# Patient Record
Sex: MALE | Race: WHITE | Employment: UNEMPLOYED | URBAN - METROPOLITAN AREA
[De-identification: names, ages, dates, MRNs, and addresses within clinical notes are randomized per-mention and may not be internally consistent; named-entity substitution may affect disease eponyms.]

---

## 2018-01-19 ENCOUNTER — OFFICE VISIT CONVERTED (OUTPATIENT)
Dept: SURGERY | Facility: CLINIC | Age: 58
End: 2018-01-19
Attending: UROLOGY

## 2018-04-16 ENCOUNTER — OFFICE VISIT CONVERTED (OUTPATIENT)
Dept: SURGERY | Facility: CLINIC | Age: 58
End: 2018-04-16
Attending: SURGERY

## 2020-04-28 ENCOUNTER — TELEMEDICINE CONVERTED (OUTPATIENT)
Dept: UROLOGY | Facility: CLINIC | Age: 60
End: 2020-04-28
Attending: UROLOGY

## 2020-05-28 ENCOUNTER — OFFICE VISIT CONVERTED (OUTPATIENT)
Dept: OTOLARYNGOLOGY | Facility: CLINIC | Age: 60
End: 2020-05-28
Attending: OTOLARYNGOLOGY

## 2020-06-12 ENCOUNTER — TELEMEDICINE CONVERTED (OUTPATIENT)
Dept: UROLOGY | Facility: CLINIC | Age: 60
End: 2020-06-12
Attending: UROLOGY

## 2020-06-26 ENCOUNTER — HOSPITAL ENCOUNTER (OUTPATIENT)
Dept: SURGERY | Facility: CLINIC | Age: 60
Discharge: HOME | End: 2020-06-26
Attending: UROLOGY

## 2020-06-26 ENCOUNTER — PROCEDURE VISIT CONVERTED (OUTPATIENT)
Dept: UROLOGY | Facility: CLINIC | Age: 60
End: 2020-06-26
Attending: UROLOGY

## 2020-08-13 ENCOUNTER — HOSPITAL ENCOUNTER (OUTPATIENT)
Dept: FAMILY MEDICINE CLINIC | Facility: CLINIC | Age: 60
Discharge: HOME | End: 2020-08-13
Attending: OTOLARYNGOLOGY

## 2020-08-14 LAB — SARS-COV-2 RNA SPEC QL NAA+PROBE: NOT DETECTED

## 2020-08-18 ENCOUNTER — HOSPITAL ENCOUNTER (OUTPATIENT)
Dept: PERIOP | Facility: HOSPITAL | Age: 60
Setting detail: HOSPITAL OUTPATIENT SURGERY
Discharge: HOME | End: 2020-08-18
Attending: OTOLARYNGOLOGY

## 2020-08-27 ENCOUNTER — OFFICE VISIT CONVERTED (OUTPATIENT)
Dept: OTOLARYNGOLOGY | Facility: CLINIC | Age: 60
End: 2020-08-27
Attending: OTOLARYNGOLOGY

## 2020-09-02 ENCOUNTER — HOSPITAL ENCOUNTER (OUTPATIENT)
Dept: PHYSICAL THERAPY | Facility: CLINIC | Age: 60
Setting detail: RECURRING SERIES
Discharge: HOME | End: 2020-10-26

## 2020-09-25 ENCOUNTER — HOSPITAL ENCOUNTER (OUTPATIENT)
Dept: NUCLEAR MEDICINE | Facility: HOSPITAL | Age: 60
Discharge: HOME | End: 2020-09-25
Attending: SPECIALIST

## 2020-10-16 ENCOUNTER — OFFICE VISIT CONVERTED (OUTPATIENT)
Dept: OTOLARYNGOLOGY | Facility: CLINIC | Age: 60
End: 2020-10-16
Attending: OTOLARYNGOLOGY

## 2020-11-19 ENCOUNTER — HOSPITAL ENCOUNTER (OUTPATIENT)
Dept: FAMILY MEDICINE CLINIC | Facility: CLINIC | Age: 60
Discharge: HOME | End: 2020-11-19
Attending: OTOLARYNGOLOGY

## 2020-11-21 LAB — SARS-COV-2 RNA SPEC QL NAA+PROBE: NOT DETECTED

## 2020-11-24 ENCOUNTER — HOSPITAL ENCOUNTER (OUTPATIENT)
Dept: PERIOP | Facility: HOSPITAL | Age: 60
Setting detail: HOSPITAL OUTPATIENT SURGERY
Discharge: HOME | End: 2020-11-24
Attending: OTOLARYNGOLOGY

## 2020-12-03 ENCOUNTER — OFFICE VISIT CONVERTED (OUTPATIENT)
Dept: OTOLARYNGOLOGY | Facility: CLINIC | Age: 60
End: 2020-12-03
Attending: OTOLARYNGOLOGY

## 2021-05-10 NOTE — H&P
"   History and Physical      Patient Name: Jonathon Santana   Patient ID: [id number]   Sex: Male   YOB: 1960    Primary Care Provider: Melisa REYNOLDS   Referring Provider: Jerome Swan MD    Visit Date: May 28, 2020    Provider: Pan Morris MD   Location: [facility]   Location Address: [facility]  [place], [place]  [id number]          Chief Complaint     \"The cysts are driving me crazy.\"       History Of Present Illness  Jonathon Santana is a 60 year old /White male with past medical history significant for schizophrenia and tobacco abuse who presents to the office today as a consult from Jerome Swan MD for evaluation of multiple facial cysts. He tells me that the first time he can recall the cysts involving his face was in 1985 and 1 swelled up acutely prior to his wedding. Since that time he has been dealing with multiple cystic lesions involving his ear lobules and around his left nasal sidewall. He tells me that he can often express a white foul-smelling discharge from them and that they are frequently quite painful to the point where he cannot sleep on the side of his face at night. He also mentions that the ones by his nose causes difficulty with him wearing glasses. He has seen multiple dermatologists and tried everything from steroid creams to antibiotics and rubbing alcohol without improvement. He even underwent a previous surgery on his right ear lobule by plastic surgeon in [place] a number of years ago. He is a current 1 pack/day smoker.       Past Medical History  Arthritis; Chronic Obstructive Pulmonary Disease; Ear cysts; Hypertension; Shortness Of Air         Past Surgical History  *Metal Implant; Colonoscopy; Hernia; Hip Replacement; Shoulder Replacement, Left         Medication List  carvedilol 12.5 mg oral tablet; minocycline 100 mg oral tablet; oxycodone 10 mg oral tablet; valsartan-hydrochlorothiazide 320-25 mg " "oral tablet         Allergy List  NO KNOWN DRUG ALLERGIES         Family Medical History  Family history of stroke         Social History  Caffeine (Current every day); Second hand smoke exposure (Current some day); Tobacco (Current every day)         Review of Systems  · Constitutional  o Denies  o : fever, night sweats, weight loss  · Eyes  o Denies  o : discharge from eye, impaired vision  · HENT  o Admits  o : *See HPI  · Cardiovascular  o Denies  o : chest pain, irregular heart beats  · Respiratory  o Admits  o : wheezing  o Denies  o : shortness of breath, coughing up blood  · Gastrointestinal  o Denies  o : heartburn, reflux, vomiting blood  · Genitourinary  o Denies  o : frequency  · Integument  o Admits  o : rash, skin dryness  · Neurologic  o Denies  o : seizures, loss of balance, loss of consciousness, dizziness  · Endocrine  o Denies  o : cold intolerance, heat intolerance  · Heme-Lymph  o Denies  o : easy bleeding, anemia      Vitals  Date Time BP Position Site L\R Cuff Size HR RR TEMP (F) WT  HT  BMI kg/m2 BSA m2 O2 Sat        05/28/2020 03:25 PM        98 191lbs 6oz 5'  6\" 30.89 2.01           Physical Examination  · Constitutional  o Appearance  o : well developed, well-nourished, alert and in no acute distress, voice clear and strong  · Head and Face  o Head  o :   § Inspection  § : no deformities or lesions  o Face  o :   § Inspection  § : Multiple small cystic lesions involving the bilateral lobules, left postauricular region, and left nasal sidewall; House-Brackmann I/VI bilaterally  § Palpation  § : No TMJ crepitus nor  muscle tenderness bilaterally  · Eyes  o Vision  o :   § Visual Fields  § : Extraocular movements are intact. No spontaneous or gaze-induced nystagmus.  o Conjunctivae  o : clear  o Sclerae  o : clear  o Pupils and Irises  o : pupils equal, round, and reactive to light.   · Ears, Nose, Mouth and Throat  o Ears  o :   § External Ears  § : appearance within normal " limits, no lesions present  § Otoscopic Examination  § : tympanic membrane appearance within normal limits bilaterally without perforations, well-aerated middle ears  § Hearing  § : intact to conversational voice both ears  o Nose  o :   § External Nose  § : appearance normal  § Intranasal Exam  § : mucosa within normal limits, vestibules normal, no intranasal lesions present, septum midline, sinuses non tender to percussion  o Oral Cavity  o :   § Oral Mucosa  § : oral mucosa normal without pallor or cyanosis  § Lips  § : lip appearance normal  § Teeth  § : Edentulous  § Gums  § : gums pink, non-swollen, no bleeding present  § Tongue  § : tongue appearance normal; normal mobility  § Palate  § : hard palate normal, soft palate appearance normal with symmetric mobility  o Throat  o :   § Oropharynx  § : no inflammation or lesions present, tonsils surgically absent  § Hypopharynx  § : Deferred secondary to gag reflex  § Larynx  § : Deferred secondary to gag reflex  · Neck  o Inspection/Palpation  o : normal appearance, no masses or tenderness, trachea midline; thyroid size normal, nontender, no nodules or masses present on palpation  · Respiratory  o Respiratory Effort  o : breathing unlabored  o Inspection of Chest  o : normal appearance, no retractions  · Cardiovascular  o Heart  o : regular rate and rhythm  · Lymphatic  o Neck  o : no lymphadenopathy present  o Supraclavicular Nodes  o : no lymphadenopathy present  o Preauricular Nodes  o : no lymphadenopathy present  · Skin and Subcutaneous Tissue  o General Inspection  o : Regarding face and neck - there are no rashes present, no lesions present, and no areas of discoloration  · Neurologic  o Cranial Nerves  o : cranial nerves II-XII are grossly intact bilaterally  o Gait and Station  o : normal gait, able to stand without diffculty  · Psychiatric  o Judgement and Insight  o : judgment and insight intact  o Mood and Affect  o : mood normal, affect  appropriate          Assessment  · Pre-Surgical Orders     V72.84/Z01.818  · Tobacco abuse     305.1/Z72.0  · Sebaceous cyst of ear     706.2/L72.3  · Sebaceous cyst     706.2/L72.3    Problems Reconciled  Plan  · Orders  o Smoking cessation counseling, 3-10 minutes [facility] ([id number]) - 305.1/Z72.0 - 05/28/2020  o Excision of other benign lesion including margins (except skin tag) face, ears, eyelids, nose, lips, mucous membranes 0.5 cm or less [facility] ([id number]) - 706.2/L72.3 - 05/28/2020  · Medications  o Medications have been Reconciled  o Transition of Care or Provider Policy  · Instructions  o PLAN:   o Handouts Provided-Pre-Procedure Instructions including date and time and location of procedure.  o ****Surgical Orders****  o ****Patient Status****  o Outpatient  o ********************  o RISK AND BENEFITS:  o Consent for surgery: Given these options, the patient has verbally expressed an understanding of the risks of surgery and finds these risks acceptable. We will proceed with surgery as soon as possible.  o Consult Anesthesia for a post-operative block, or any pain management procedure deemed necessary by the anesthesiologist for adequate post-operative pain control.   o O.R. PREP: Per protocol  o IV: Per Anesthesia  o PLEASE SIGN PERMIT FOR: Excision of sebaceous cyst involving the bilateral ear lobules left lateral nasal wall  o PRE- OP MEDICATION ORDER:  o *__Kefzol 2 gram IV on call to OR.  o *___The above History and Physical Examination has been completed within 30 days of admission.  o Tobacco and smoking cessation counseling for more than 3 minutes was completed.  o Impressions and findings were discussed with Mr. Santana at great length. Currently, he is seen for evaluation of multiple small cystic lesions involving the bilateral ear lobules and left lateral nasal wall which are often painful and occasionally become infected. Examination today reveals multiple subcentimeter cystic lesions involving those regions  with small puncta consistent with sebaceous cyst versus epidermal inclusion cyst. We discussed the pathophysiology and natural history of these benign lesions. Options for management were discussed including referral to a dermatologist for their evaluation versus excision. We discussed the risks, benefits, and alternatives. The risks include scarring, numbness, infection, bleeding, and recurrence of the lesions/development of new lesions. After a thorough discussion he would like to pursue surgical excision as he finds these quite bothersome. This will be scheduled at his earliest convenience.  · Correspondence  o ENT Letter to Referring MD (Jerome Swan MD) - 05/28/2020            Electronically Signed by: Pan Morris MD -Author on May 28, 2020 04:46:39 PM

## 2021-05-10 NOTE — PROCEDURES
Procedure Note      Patient Name: Jonathon Santana   Patient ID: [id number]   Sex: Male   YOB: 1960    Primary Care Provider: Melisa REYNOLDS   Referring Provider: Jerome Sawn MD    Visit Date: June 26, 2020    Provider: Jerome Swan MD   Location: [facility]   Location Address: [address] [place] [address]  [id number]   Location Phone: [phone]             After appropriate consent patient was taken to the procedure room.  A multidisciplinary timeout taken documenting her patient signed procedure.    Patient was prepped draped normal sterile fashion.  He had 2 lesions what is thought to be sebaceous cyst we are removing.  He had one on the anterior scrotum in the midline that was about 1 cm and one on the posterior scrotum and the perineum that was about 5 mm.  These were both removed with cold knife and electrocautery.  Bleeding was stopped and incisions were stitched up with 4-0 chromic.  Patient tolerated procedure well           Assessment  · Scrotal lesion     608.9/N50.9      Plan  · Orders  o Excision of cyst of scrotum (57863, 61158, 90567, 99478, 57110, 93189) - 608.9/N50.9 - 06/26/2020  · Medications  o Medications have been Reconciled  o Transition of Care or Provider Policy                    Electronically Signed by: Jerome Swan MD -Author on June 26, 2020 04:52:43 PM

## 2021-05-12 NOTE — PROGRESS NOTES
Progress Note      Patient Name: Jonathon Santana   Patient ID: [id number]   Sex: Male   YOB: 1960    Primary Care Provider: Jonathon Chapa MD   Referring Provider: Jonathon Chapa MD    Visit Date: April 28, 2020    Provider: Jerome Swan MD   Location: [facility]   Location Address: [address] [place] [address]  [id number]   Location Phone: [phone]          Chief Complaint  · pt here for urologic issues      History Of Present Illness  Video Conferencing Visit  Jonathon Santana is a 60 year old /White male who is presenting for evaluation via video conferencing. Verbal consent obtained before beginning visit.   The following staff were present during this visit: Madeline Daisyrosa m        61 yo WM with schizophrenia who is been dealing with epidermoid cyst on his ears and also on his scrotum.    Patient has been dealing with these cyst for several years.  Patient has been dealing with a cyst most recently on his scrotum about 8 months.  No treatment.    No Erythema or cellulitis around the lesions.    Patient has 2 of these on the scrotum.    Voiding without issue  no gross hematuria    No urologic family history    2011 sebaceous cyst removed from the scrotum -fix the problem for several years    No CAD, COPD.  0.5 ppd.  Patient does not use blood thinner.  No DM                 Past Medical History  Arthritis; Arthritis; Chronic Obstructive Pulmonary Disease; High blood pressure; Hypertension; Limb Pain; Limb Swelling; Shortness Of Air         Past Surgical History  *Metal Implant; Colonoscopy; Hernia; Hip Replacement; Shoulder Replacement, Left         Medication List  carvedilol 25 mg oral tablet; Klor-Con 20 mEq oral packet; lisinopril 40 mg oral tablet; pantoprazole 40 mg oral tablet,delayed release (DR/EC); Percocet  mg oral tablet; propranolol 10 mg oral tablet; valsartan oral         Allergy List  NO KNOWN DRUG ALLERGIES       Allergies  Reconciled  Family Medical History  Stroke; -Father's Family History Unknown; -Mother's Family History Unknown         Social History  Caffeine (Current every day); Second hand smoke exposure (Current some day); Tobacco (Current every day)         Review of Systems  · Constitutional  o Denies  o : chills, fever  · Gastrointestinal  o Denies  o : nausea, vomiting      Physical Examination  · Constitutional  o Appearance  o : Well-appearing, well-developed, in no acute distress  · Respiratory  o Respiratory Effort  o : Unlabored breathing  · Neurologic  o Mental Status Examination  o :   § Orientation  § : Grossly oriented to person, place and time, judgment and insight intact, normal mood and affect              Assessment  · Scrotal cyst     706.2/L72.9    Problems Reconciled  Plan  · Medications  o Medications have been Reconciled  o Transition of Care or Provider Policy  · Instructions  o Electronically Identified Patient Education Materials Provided Electronically       Records reviewed today and summarized in the chart    Patient has what sounds like sebaceous cysts of the scrotum.  He also has some involvement of some type of cyst that is bothering him on his ears    I will have him see ENT in the next few weeks to discuss .  I will also have him see me back in clinic in about 6 weeks when hopefully we will be able to schedule more elective procedures.    At that time I will get him scheduled for removal of some of the sebaceous cyst on the scrotum if they are still bothering him.  If he has seen ENT and they want to do something at the same time we could also consider this.    We did discuss feels like he is getting fevers or signs of erythema/cellulitis I would want him be seen the emergency room or be seen sooner.  Patient voiced understanding.    Greater than 20 minutes was used in counseling and coordination of care, with greater than 51% of this in face-to-face counseling             Electronically Signed  by: Jerome Swan MD -Author on April 28, 2020 05:17:44 PM

## 2021-05-13 NOTE — PROGRESS NOTES
"   Progress Note      Patient Name: Jonathon Santana   Patient ID: [id number]   Sex: Male   YOB: 1960    Primary Care Provider: Melisa REYNOLDS   Referring Provider: Jerome Swan MD    Visit Date: December 3, 2020    Provider: Pan Morris MD   Location: [facility]   Location Address: [address] [place] [address]  [id number]          Chief Complaint     \"Everything seems to be doing better\"       History Of Present Illness     Jonathon Santana is a 60 year old /White male with past medical history significant for schizophrenia, alcohol abuse, and tobacco abuse who returns today for follow-up status post excision of multiple facial cystic lesions on 8/18/2020 with excision of bilateral periauricular epidermal inclusion cysts on 11/24/2020.  Final pathology revealed epidermal inclusion cysts.  He tells me that he has not experienced any pain since surgery and is no longer experiencing any drainage in those areas.            Past Medical History  Arthritis; Chronic Obstructive Pulmonary Disease; Epidermal inclusion cyst; Hypertension; Sebaceous cyst of ear; Shortness Of Air; Tobacco abuse         Past Surgical History  *Metal Implant; Colonoscopy; Excision of cyst of both ears; Hernia; Hip Replacement; Shoulder Replacement, Left         Medication List  amlodipine 10 mg oral tablet; aspirin 81 mg oral tablet,chewable; atorvastatin 20 mg oral tablet; carvedilol 12.5 mg oral tablet; doxepin 25 mg oral capsule; oxycodone 10 mg oral tablet; valsartan-hydrochlorothiazide 320-25 mg oral tablet         Allergy List  NO KNOWN DRUG ALLERGIES         Family Medical History  Family history of stroke         Social History  Caffeine (Current every day); Second hand smoke exposure (Current some day); Tobacco (Current every day)         Review of Systems  · Constitutional  o Denies  o : fever, night sweats, weight loss  · Eyes  o Denies  o : discharge from eye, " "impaired vision  · HENT  o Admits  o : *See HPI  · Cardiovascular  o Denies  o : chest pain, irregular heart beats  · Respiratory  o Denies  o : shortness of breath, wheezing, coughing up blood  · Gastrointestinal  o Denies  o : heartburn, reflux, vomiting blood  · Genitourinary  o Denies  o : frequency  · Integument  o Denies  o : rash, skin dryness  · Neurologic  o Denies  o : seizures, loss of balance, loss of consciousness, dizziness  · Endocrine  o Denies  o : cold intolerance, heat intolerance  · Heme-Lymph  o Denies  o : easy bleeding, anemia      Vitals  Date Time BP Position Site L\R Cuff Size HR RR TEMP (F) WT  HT  BMI kg/m2 BSA m2 O2 Sat FR L/min FiO2 HC       12/03/2020 09:29 AM        98 203lbs 16oz 5'  6\" 32.93 2.08             Physical Examination  · Constitutional  o Appearance  o : well developed, well-nourished, alert and in no acute distress, voice clear and strong  · Head and Face  o Head  o :   § Inspection  § : no deformities or lesions  o Face  o :   § Inspection  § : No facial lesions; House-Brackmann I/VI bilaterally  § Palpation  § : No TMJ crepitus nor  muscle tenderness bilaterally  · Eyes  o Vision  o :   § Visual Fields  § : Extraocular movements are intact. No spontaneous or gaze-induced nystagmus.  o Conjunctivae  o : clear  o Sclerae  o : clear  o Pupils and Irises  o : pupils equal, round, and reactive to light.   · Ears, Nose, Mouth and Throat  o Ears  o :   § External Ears  § : appearance within normal limits, no lesions present. Bilateral incisions are healing well with dissolvable sutures in place  § Otoscopic Examination  § : tympanic membrane appearance within normal limits bilaterally without perforations, well-aerated middle ears  § Hearing  § : intact to conversational voice both ears  o Nose  o :   § External Nose  § : appearance normal  § Intranasal Exam  § : mucosa within normal limits, vestibules normal, no intranasal lesions present, septum midline, sinuses " non tender to percussion  o Oral Cavity  o :   § Oral Mucosa  § : oral mucosa normal without pallor or cyanosis  § Lips  § : lip appearance normal  § Teeth  § : Edentulous  § Gums  § : gums pink, non-swollen, no bleeding present  § Tongue  § : tongue appearance normal; normal mobility  § Palate  § : hard palate normal, soft palate appearance normal with symmetric mobility  o Throat  o :   § Oropharynx  § : no inflammation or lesions present, tonsils within normal limits  · Neck  o Inspection/Palpation  o : normal appearance, no masses or tenderness, trachea midline; thyroid size normal, nontender, no nodules or masses present on palpation  · Respiratory  o Respiratory Effort  o : breathing unlabored  · Lymphatic  o Neck  o : no lymphadenopathy present  o Supraclavicular Nodes  o : no lymphadenopathy present  o Preauricular Nodes  o : no lymphadenopathy present  · Skin and Subcutaneous Tissue  o General Inspection  o : Regarding face and neck - there are no rashes present, no lesions present, and no areas of discoloration  · Neurologic  o Cranial Nerves  o : cranial nerves II-XII are grossly intact bilaterally  o Gait and Station  o : normal gait, able to stand without diffculty  · Psychiatric  o Judgement and Insight  o : judgment and insight intact  o Mood and Affect  o : mood normal, affect appropriate          Assessment  · Tobacco abuse     305.1/Z72.0  · Epidermal inclusion cyst     706.2/L72.0      Plan  · Orders  o Smoking cessation counseling, 3-10 minutes [facility] ([id number]) - 305.1/Z72.0 - 12/03/2020  · Medications  o Medications have been Reconciled  o Transition of Care or Provider Policy  · Instructions  o Impressions and findings were discussed Mr. Santana at great length. Currently, he is healing well status post excision bilateral periauricular epidermal inclusion cyst on 11/24/2020. We reviewed the final pathology which revealed epidermal inclusion cyst and discussed continued postoperative care. He will  follow-up in 1 year or sooner if needed.            Electronically Signed by: Pan Morris MD -Author on December 3, 2020 09:53:43 AM

## 2021-05-13 NOTE — PROGRESS NOTES
Progress Note      Patient Name: Jonathon Santana   Patient ID: [id number]   Sex: Male   YOB: 1960    Primary Care Provider: Melisa REYNOLDS   Referring Provider: Jerome Swan MD    Visit Date: June 12, 2020    Provider: Jerome Swan MD   Location: [facility]   Location Address: [address] [place] [address]  [id number]   Location Phone: [phone]          Chief Complaint  · urologic issues      History Of Present Illness       59 yo WM with schizophrenia who is been dealing with epidermoid cyst on his ears and also on his scrotum.    Patient has seen ENT.  Patient was given the option of doing this in the office versus the operating room.    Patient has some sebaceous cyst he has been dealing with for years.  He is had sebaceous cyst removed from the scrotum several times.  He usually squeezes these 2 get the sebum out to keep the smaller.  Right now there is one bothering him on the anterior midline of the scrotum and also one on the perineum.    Previous    Patient has been dealing with these cyst for several years.  Patient has been dealing with a cyst most recently on his scrotum about 8 months.  No treatment.    No Erythema or cellulitis around the lesions.    Patient has 2 of these on the scrotum.    Voiding without issue    No urologic family history    2011 sebaceous cyst removed from the scrotum -fix the problem for several years    No CAD, COPD.  0.5 ppd.  Patient does not use blood thinner.  No DM                 Past Medical History  Arthritis; Chronic Obstructive Pulmonary Disease; Ear cysts; Hypertension; Shortness Of Air         Past Surgical History  *Metal Implant; Colonoscopy; Hernia; Hip Replacement; Shoulder Replacement, Left         Medication List  carvedilol 12.5 mg oral tablet; minocycline 100 mg oral tablet; oxycodone 10 mg oral tablet; valsartan-hydrochlorothiazide 320-25 mg oral tablet         Allergy List  NO KNOWN DRUG ALLERGIES        Allergies Reconciled  Family Medical History  Family history of stroke         Social History  Caffeine (Current every day); Second hand smoke exposure (Current some day); Tobacco (Current every day)         Review of Systems  · Constitutional  o Denies  o : chills, fever  · Gastrointestinal  o Denies  o : nausea, vomiting      Physical Examination  · Constitutional  o Appearance  o : Well-appearing, well-developed, in no acute distress  · Respiratory  o Respiratory Effort  o : Unlabored breathing  · Neurologic  o Mental Status Examination  o :   § Orientation  § : Grossly oriented to person, place and time, judgment and insight intact, normal mood and affect       Normal circumcised phallus.  Patient has a small 5 mm sebaceous cyst midline anterior scrotum.  This is the one he points to that bothersome.  Patient also with an area on the perineum a little bit to the left that is also bothering him.  He does say this actually a sebaceous cyst gets larger if he does not squeeze it.  I cannot feel anything today.               Assessment  · Scrotal cyst     706.2/L72.9    Problems Reconciled  Plan  · Medications  o Medications have been Reconciled  o Transition of Care or Provider Policy  · Instructions  o Electronically Identified Patient Education Materials Provided Electronically       Discuused with the patient I cannot feel the sebaceous cyst in the perineum.  I cannot feel the one on the scrotum.  He would like this removed.  I will bring him in for office removal of sebaceous cyst of the scrotum and perineum.  If the one in the perineum is more definitive at that day I will remove it also.  Risks and benefits were discussed including bleeding, infection and damage to the urinary system.  Patient voiced understanding and would like to proceed.     Greater than 15 minutes was used in counseling and coordination of care, with greater than 51% of this in face-to-face counseling             Electronically  Signed by: Jerome Swan MD -Author on June 12, 2020 12:01:50 PM

## 2021-05-13 NOTE — PROGRESS NOTES
"   Progress Note      Patient Name: Jonathon Santana   Patient ID: [id number]   Sex: Male   YOB: 1960    Primary Care Provider: Melisa REYNOLDS   Referring Provider: Jerome Swan MD    Visit Date: August 27, 2020    Provider: Pan Morris MD   Location: [facility]   Location Address: [facility] [employer] [place], [place]  [id number]          Chief Complaint     \"I am okay.\"       History Of Present Illness     Jonathon Santana is a 60 year old /White male with past medical history significant for schizophrenia, alcohol abuse, and tobacco abuse who returns today for follow-up status post excision of multiple facial cystic lesions on 8/18/2020.  Final pathology revealed epidermal inclusion cysts.  He tells me that he was doing well until around 1 day ago when the left ear excision site began leaking \"fluid.\"  It is not foul-smelling and he has not had any issues with fever, chills, or night sweats.  The other 2 locations are doing well.  He has been applying antibiotic ointment to the incisions 3 times daily.  He is only smoked 3 cigarettes since surgery.  He was admitted a few days after surgery secondary to angina.  He reports that his blood pressure has been better since discharge.       Past Medical History  Arthritis; Chronic Obstructive Pulmonary Disease; Hypertension; Sebaceous cyst of ear; Shortness Of Air; Tobacco abuse         Past Surgical History  *Metal Implant; Colonoscopy; Excision of cyst of both ears; Hernia; Hip Replacement; Shoulder Replacement, Left         Medication List  carvedilol 12.5 mg oral tablet; minocycline 100 mg oral tablet; oxycodone 10 mg oral tablet; valsartan-hydrochlorothiazide 320-25 mg oral tablet         Allergy List  NO KNOWN DRUG ALLERGIES         Family Medical History  Family history of stroke         Social History  Caffeine (Current every day); Second hand smoke exposure (Current some day); Tobacco (Current " "every day)         Review of Systems  · Constitutional  o Denies  o : fever, night sweats, weight loss  · Eyes  o Denies  o : discharge from eye, impaired vision  · HENT  o Admits  o : *See HPI  · Cardiovascular  o Denies  o : chest pain, irregular heart beats  · Respiratory  o Denies  o : shortness of breath, wheezing, coughing up blood  · Gastrointestinal  o Denies  o : heartburn, reflux, vomiting blood  · Genitourinary  o Denies  o : frequency  · Integument  o Denies  o : rash, skin dryness  · Neurologic  o Denies  o : seizures, loss of balance, loss of consciousness, dizziness  · Endocrine  o Denies  o : cold intolerance, heat intolerance  · Heme-Lymph  o Denies  o : easy bleeding, anemia      Vitals  Date Time BP Position Site L\R Cuff Size HR RR TEMP (F) WT  HT  BMI kg/m2 BSA m2 O2 Sat HC       08/27/2020 03:34 PM        97.3 195lbs 8oz 5'  6\" 31.55 2.03           Physical Examination  · Constitutional  o Appearance  o : well developed, well-nourished, alert and in no acute distress, voice clear and strong  · Head and Face  o Head  o :   § Inspection  § : no deformities or lesions  o Face  o :   § Inspection  § : Right posterior lobular incision is healing well, left lateral nasal wall incision is healing well, left pre-and post lobular incision aside from a tiny dehiscence through which a small amount of serosanguineous drainage is expressible. There is a small lymph node adjacent to this area in the postauricular region which is slightly tender to palpation. House-Brackmann I/VI bilaterally  § Palpation  § : No TMJ crepitus nor  muscle tenderness bilaterally  · Eyes  o Vision  o :   § Visual Fields  § : Extraocular movements are intact. No spontaneous or gaze-induced nystagmus.  o Conjunctivae  o : clear  o Sclerae  o : clear  o Pupils and Irises  o : pupils equal, round, and reactive to light.   · Ears, Nose, Mouth and Throat  o Ears  o :   § External Ears  § : appearance within normal limits, no " lesions present  § Otoscopic Examination  § : tympanic membrane appearance within normal limits bilaterally without perforations, well-aerated middle ears  § Hearing  § : intact to conversational voice both ears  o Nose  o :   § External Nose  § : appearance normal  § Intranasal Exam  § : mucosa within normal limits, vestibules normal, no intranasal lesions present, septum midline, sinuses non tender to percussion  o Oral Cavity  o :   § Oral Mucosa  § : oral mucosa normal without pallor or cyanosis  § Lips  § : lip appearance normal  § Teeth  § : Edentulous  § Gums  § : gums pink, non-swollen, no bleeding present  § Tongue  § : tongue appearance normal; normal mobility  § Palate  § : hard palate normal, soft palate appearance normal with symmetric mobility  o Throat  o :   § Oropharynx  § : no inflammation or lesions present, tonsils within normal limits  · Neck  o Inspection/Palpation  o : normal appearance, no masses or tenderness, trachea midline; thyroid size normal, nontender, no nodules or masses present on palpation  · Respiratory  o Respiratory Effort  o : breathing unlabored  · Lymphatic  o Neck  o : no lymphadenopathy present  o Supraclavicular Nodes  o : no lymphadenopathy present  o Preauricular Nodes  o : no lymphadenopathy present  · Skin and Subcutaneous Tissue  o General Inspection  o : Regarding face and neck - there are no rashes present, no lesions present, and no areas of discoloration  · Neurologic  o Cranial Nerves  o : cranial nerves II-XII are grossly intact bilaterally  o Gait and Station  o : normal gait, able to stand without diffculty  · Psychiatric  o Judgement and Insight  o : judgment and insight intact  o Mood and Affect  o : mood normal, affect appropriate          Assessment  · Tobacco abuse     305.1/Z72.0  · Epidermal inclusion cyst     706.2/L72.0    Problems Reconciled  Plan  · Medications  o Keflex 500 mg oral capsule   SIG: take 1 capsule (500 mg) by oral route every 12 hours  for 7 days   DISP: (14) capsules with 0 refills  Prescribed on 08/27/2020     o Medications have been Reconciled  o Transition of Care or Provider Policy  · Instructions  o Impressions and findings are discussed Mr. Santana at great length. Currently, he is healing well status post excision multiple epidermal inclusion cyst involving the left lobular region, right lobular region, left lateral nasal wall region on 8/18/2020. Examination today reveals a tiny dehiscence perilobular incision out of which a small amount of serosanguineous drainage is expressible. There is also a reactive postauricular lymph node on that side which is slightly tender to palpation. He will be placed on a 7-day course of Keflex and was advised to continue using antibiotic ointment as that heals. He will follow-up in 4 weeks or sooner if needed.            Electronically Signed by: Pan Morris MD -Author on August 27, 2020 04:33:40 PM

## 2021-05-13 NOTE — PROGRESS NOTES
"   Progress Note      Patient Name: Jonathon Santana   Patient ID: [id number]   Sex: Male   YOB: 1960    Primary Care Provider: Melisa REYNOLDS   Referring Provider: Jerome Swan MD    Visit Date: October 16, 2020    Provider: Pan Morris MD   Location: [facility]   Location Address: [address] [place] [address], [address] [place] [address]  [id number]   Location Phone: [phone]          Chief Complaint     \"More popped up.\"       History Of Present Illness     Jonathon Santana is a 60 year old /White male with past medical history significant for schizophrenia, alcohol abuse, and tobacco abuse who returns today for follow-up status post excision of multiple facial cystic lesions on 8/18/2020.  Final pathology revealed epidermal inclusion cysts.  He was last seen on 8/27/2020 which time there was a small area of dehiscence in the left pre and post lobular incisions.  He reported continuing to smoke.  He was placed on a course of Keflex and tells me that the drainage has since resolved.  However, he is now quite bothered by the right-sided mid lobular cyst and a left postauricular cyst which he can express.           Past Medical History  Arthritis; Chronic Obstructive Pulmonary Disease; Epidermal inclusion cyst; Hypertension; Sebaceous cyst of ear; Shortness Of Air; Tobacco abuse         Past Surgical History  *Metal Implant; Colonoscopy; Excision of cyst of both ears; Hernia; Hip Replacement; Shoulder Replacement, Left         Medication List  amlodipine 10 mg oral tablet; aspirin 81 mg oral tablet,chewable; atorvastatin 20 mg oral tablet; carvedilol 12.5 mg oral tablet; doxepin 25 mg oral capsule; doxycycline monohydrate 50 mg oral tablet; oxycodone 10 mg oral tablet; valsartan-hydrochlorothiazide 320-25 mg oral tablet         Allergy List  NO KNOWN DRUG ALLERGIES         Family Medical History  Family history of stroke         Social History  Caffeine (Current every " "day); Second hand smoke exposure (Current some day); Tobacco (Current every day)         Review of Systems  · Constitutional  o Denies  o : fever, night sweats, weight loss  · Eyes  o Denies  o : discharge from eye, impaired vision  · HENT  o Admits  o : *See HPI  · Cardiovascular  o Admits  o : chest pain, irregular heart beats  · Respiratory  o Admits  o : shortness of breath, wheezing  o Denies  o : coughing up blood  · Gastrointestinal  o Denies  o : heartburn, reflux, vomiting blood  · Genitourinary  o Denies  o : frequency  · Integument  o Denies  o : rash, skin dryness  · Neurologic  o Denies  o : seizures, loss of balance, loss of consciousness, dizziness  · Endocrine  o Denies  o : cold intolerance, heat intolerance  · Heme-Lymph  o Denies  o : easy bleeding, anemia      Vitals  Date Time BP Position Site L\R Cuff Size HR RR TEMP (F) WT  HT  BMI kg/m2 BSA m2 O2 Sat FR L/min FiO2        10/16/2020 01:43 PM        98.1 200lbs 8oz 5'  6\" 32.36 2.06             Physical Examination  · Constitutional  o Appearance  o : well developed, well-nourished, alert and in no acute distress, voice clear and strong  · Head and Face  o Head  o :   § Inspection  § : no deformities or lesions  o Face  o :   § Inspection  § : Left lobule is detached. There is a left postauricular cyst out of which typical epidermal inclusion cyst material can be expressed. There is a small right mid lobular epidermal inclusion cyst. House-Brackmann I/VI bilaterally  § Palpation  § : No TMJ crepitus nor  muscle tenderness bilaterally  · Eyes  o Vision  o :   § Visual Fields  § : Extraocular movements are intact. No spontaneous or gaze-induced nystagmus.  o Conjunctivae  o : clear  o Sclerae  o : clear  o Pupils and Irises  o : pupils equal, round, and reactive to light.   · Ears, Nose, Mouth and Throat  o Ears  o :   § External Ears  § : appearance within normal limits, no lesions present  § Otoscopic Examination  § : tympanic " membrane appearance within normal limits bilaterally without perforations, well-aerated middle ears  § Hearing  § : intact to conversational voice both ears  o Nose  o :   § External Nose  § : appearance normal  § Intranasal Exam  § : mucosa within normal limits, vestibules normal, no intranasal lesions present, septum midline, sinuses non tender to percussion  o Oral Cavity  o :   § Oral Mucosa  § : oral mucosa normal without pallor or cyanosis  § Lips  § : lip appearance normal  § Teeth  § : Edentulous  § Gums  § : gums pink, non-swollen, no bleeding present  § Tongue  § : tongue appearance normal; normal mobility  § Palate  § : hard palate normal, soft palate appearance normal with symmetric mobility  o Throat  o :   § Oropharynx  § : no inflammation or lesions present, tonsils within normal limits  · Neck  o Inspection/Palpation  o : normal appearance, no masses or tenderness, trachea midline; thyroid size normal, nontender, no nodules or masses present on palpation  · Respiratory  o Respiratory Effort  o : breathing unlabored  o Inspection of Chest  o : normal appearance, no retractions  · Cardiovascular  o Heart  o : regular rate and rhythm  · Lymphatic  o Neck  o : no lymphadenopathy present  o Supraclavicular Nodes  o : no lymphadenopathy present  o Preauricular Nodes  o : no lymphadenopathy present  · Skin and Subcutaneous Tissue  o General Inspection  o : Regarding face and neck - there are no rashes present, no lesions present, and no areas of discoloration  · Neurologic  o Cranial Nerves  o : cranial nerves II-XII are grossly intact bilaterally  o Gait and Station  o : normal gait, able to stand without diffculty  · Psychiatric  o Judgement and Insight  o : judgment and insight intact  o Mood and Affect  o : mood normal, affect appropriate          Assessment  · Pre-Surgical Orders     V72.84/Z01.818  · Tobacco abuse     305.1/Z72.0  · Epidermal inclusion  cyst     706.2/L72.0      Plan  · Orders  o Smoking cessation counseling, 3-10 minutes [facility] ([address]197) - 305.1/Z72.0 - 10/16/2020  o Excision of benign lesion of ear, 0.5 cm or less in diameter including margins (61624) - 706.2/L72.0 - 10/21/2020  · Medications  o Medications have been Reconciled  o Transition of Care or Provider Policy  · Instructions  o PLAN:   o Handouts Provided-Pre-Procedure Instructions including date and time and location of procedure.  o ****Surgical Orders****  o ****Patient Status****  o Outpatient  o ********************  o RISK AND BENEFITS:  o Consent for surgery: Given these options, the patient has verbally expressed an understanding of the risks of surgery and finds these risks acceptable. We will proceed with surgery as soon as possible.  o Consult Anesthesia for a post-operative block, or any pain management procedure deemed necessary by the anesthesiologist for adequate post-operative pain control.   o O.R. PREP: Per protocol  o IV: Per Anesthesia  o PLEASE SIGN PERMIT FOR:  o PRE- OP MEDICATION ORDER:  o *__Kefzol 2 gram IV on call to OR.  o *___The above History and Physical Examination has been completed within 30 days of admission.  o Tobacco and smoking cessation counseling for more than 3 minutes was completed.  o Impressions and findings were discussed with Mr. Santana at great length. Currently, he is bothered by new and separate lesions including a right mid lobular epidermal inclusion cyst in the left postauricular epidermal inclusion cyst. The left lobule is also detached from his face I suspect this may be related to recurrent digital trauma or poor wound healing. We discussed the pathophysiology and natural history of these benign lesions. We discussed that he is likely to form other cysts over time but he is quite bothered by the previously mentioned lesions. Options for management were discussed and he would like to pursue excision of his right mid lobular and left  postauricular cyst. We again discussed the risks, benefits, and alternatives. He was given ample time to ask questions, which were answered to the best my ability. This will be scheduled at his earliest convenience.            Electronically Signed by: Pan Morris MD -Author on October 21, 2020 08:24:42 AM

## 2021-05-14 VITALS — HEIGHT: 66 IN | WEIGHT: 204 LBS | TEMPERATURE: 98 F | BODY MASS INDEX: 32.78 KG/M2

## 2021-05-14 VITALS — WEIGHT: 200.5 LBS | HEIGHT: 66 IN | BODY MASS INDEX: 32.22 KG/M2 | TEMPERATURE: 98.1 F

## 2021-05-14 VITALS — WEIGHT: 195.5 LBS | HEIGHT: 66 IN | BODY MASS INDEX: 31.42 KG/M2 | TEMPERATURE: 97.3 F

## 2021-05-15 VITALS — HEIGHT: 66 IN | WEIGHT: 191.37 LBS | TEMPERATURE: 98 F | BODY MASS INDEX: 30.75 KG/M2

## 2021-05-16 VITALS — WEIGHT: 183.5 LBS | RESPIRATION RATE: 14 BRPM | HEIGHT: 66 IN | BODY MASS INDEX: 29.49 KG/M2

## 2021-05-16 VITALS — WEIGHT: 193 LBS | BODY MASS INDEX: 31.02 KG/M2 | HEIGHT: 66 IN | RESPIRATION RATE: 16 BRPM

## 2021-12-02 RX ORDER — VALSARTAN AND HYDROCHLOROTHIAZIDE 320; 25 MG/1; MG/1
TABLET, FILM COATED ORAL
COMMUNITY

## 2021-12-02 RX ORDER — CARVEDILOL 12.5 MG/1
TABLET ORAL
COMMUNITY

## 2021-12-02 RX ORDER — PROPRANOLOL HYDROCHLORIDE 10 MG/1
TABLET ORAL
COMMUNITY

## 2021-12-02 RX ORDER — DOXEPIN HYDROCHLORIDE 25 MG/1
CAPSULE ORAL
COMMUNITY

## 2021-12-02 RX ORDER — OXYCODONE AND ACETAMINOPHEN 10; 325 MG/1; MG/1
TABLET ORAL
COMMUNITY

## 2021-12-02 RX ORDER — AMLODIPINE BESYLATE 10 MG/1
TABLET ORAL
COMMUNITY

## 2021-12-02 RX ORDER — POTASSIUM CHLORIDE 1.5 G/1.77G
POWDER, FOR SOLUTION ORAL
COMMUNITY

## 2021-12-02 RX ORDER — LISINOPRIL 40 MG/1
TABLET ORAL
COMMUNITY

## 2021-12-02 RX ORDER — ATORVASTATIN CALCIUM 20 MG/1
TABLET, FILM COATED ORAL
COMMUNITY

## 2021-12-02 RX ORDER — PANTOPRAZOLE SODIUM 40 MG/1
TABLET, DELAYED RELEASE ORAL
COMMUNITY

## 2021-12-02 RX ORDER — OXYCODONE HYDROCHLORIDE 10 MG/1
TABLET ORAL
COMMUNITY

## 2021-12-02 RX ORDER — ASPIRIN 81 MG/1
TABLET, CHEWABLE ORAL
COMMUNITY

## 2021-12-09 ENCOUNTER — OFFICE VISIT (OUTPATIENT)
Dept: OTOLARYNGOLOGY | Facility: CLINIC | Age: 61
End: 2021-12-09

## 2021-12-09 VITALS — TEMPERATURE: 98 F | WEIGHT: 192.8 LBS | HEIGHT: 66 IN | BODY MASS INDEX: 30.98 KG/M2

## 2021-12-09 DIAGNOSIS — L72.0 EPIDERMAL INCLUSION CYST: Primary | ICD-10-CM

## 2021-12-09 PROCEDURE — 99212 OFFICE O/P EST SF 10 MIN: CPT | Performed by: OTOLARYNGOLOGY

## 2021-12-09 RX ORDER — ALBUTEROL SULFATE 2.5 MG/3ML
SOLUTION RESPIRATORY (INHALATION)
COMMUNITY

## 2021-12-09 RX ORDER — DIVALPROEX SODIUM 500 MG/1
TABLET, EXTENDED RELEASE ORAL
COMMUNITY

## 2021-12-09 RX ORDER — ALBUTEROL SULFATE 90 UG/1
AEROSOL, METERED RESPIRATORY (INHALATION)
COMMUNITY

## 2021-12-09 RX ORDER — DULOXETIN HYDROCHLORIDE 60 MG/1
CAPSULE, DELAYED RELEASE ORAL
COMMUNITY

## 2021-12-09 RX ORDER — METHOCARBAMOL 500 MG/1
TABLET, FILM COATED ORAL
COMMUNITY

## 2021-12-09 RX ORDER — DOXYCYCLINE 50 MG/1
CAPSULE ORAL EVERY 12 HOURS
COMMUNITY

## 2021-12-09 RX ORDER — DULOXETIN HYDROCHLORIDE 30 MG/1
CAPSULE, DELAYED RELEASE ORAL
COMMUNITY

## 2021-12-09 RX ORDER — VALSARTAN 320 MG/1
TABLET ORAL
COMMUNITY

## 2021-12-09 RX ORDER — TRIAMCINOLONE ACETONIDE 1 MG/G
CREAM TOPICAL
COMMUNITY

## 2021-12-09 RX ORDER — MALATHION 0 G/ML
LOTION TOPICAL
COMMUNITY

## 2021-12-09 NOTE — PROGRESS NOTES
Patient Name: Jonathon Santana   Visit Date: 12/09/2021   Patient ID: [id number]  Provider: Pan Morris MD    Sex: male  Location: [facility]   YOB: 1960  Location Address: [address] [place] [address], [address] [place] [address],?[id number]    Primary Care Provider Leslie Meadows MD  Location Phone: [phone]    Referring Provider: No ref. provider found        Chief Complaint  Follow-up    History of Present Illness  Jonathon Santana is a 61 y.o. male with past medical history significant for schizophrenia, alcohol abuse, and tobacco abuse who returns today for follow-up status post excision of multiple facial cystic lesions on 8/18/2020 with excision of bilateral periauricular epidermal inclusion cysts on 11/24/2020.  Final pathology revealed epidermal inclusion cysts.  He was last seen 12/3/2020 at which time he was doing well.  He tells me that his right ear is doing well but he occasionally experiences drainage from his left ear lobular crease.  He can express this usually once weekly.  He did start smoking again.  He also reports a right-sided shoulder fracture which has not healed.      Past Medical History:   Diagnosis Date   • Anxiety    • Arthritis    • COPD (chronic obstructive pulmonary disease) (HCC)    • Epidermal inclusion cyst    • Hepatitis C    • History of colon polyps    • Hypertension    • Mentally disabled    • Sebaceous cyst of ear    • Shortness of breath    • Tobacco abuse        Past Surgical History:   Procedure Laterality Date   • COLONOSCOPY  2009   • EXCISION / CURRETTAGE OF BONE CYST / BENIGN TUMOR OF PROXIMAL HUMERUS W/ OR W/O BONE GRAFT  08/2020    Excision of cyst of both ears   • OTHER SURGICAL HISTORY      Metal Implant unspecified   • OTHER SURGICAL HISTORY      Hernia unspecified   • TOTAL HIP ARTHROPLASTY      Hip replacement unspecified   • TOTAL SHOULDER REPLACEMENT Left    • UPPER GASTROINTESTINAL ENDOSCOPY  05/2012    Dr. Ron Galo's  esophagus; changes consistant with chronic gastritis          Current Outpatient Medications:   •  amLODIPine (NORVASC) 10 MG tablet, amlodipine 10 mg oral tablet take 1 tablet (10 mg) by oral route once daily   Active, Disp: , Rfl:   •  atorvastatin (LIPITOR) 20 MG tablet, atorvastatin 20 mg oral tablet take 1 tablet (20 mg) by oral route once daily   Active, Disp: , Rfl:   •  carvedilol (COREG) 12.5 MG tablet, carvedilol 12.5 mg oral tablet take 1 tablet (12.5 mg) by oral route 2 times per day with food   Active, Disp: , Rfl:   •  oxyCODONE-acetaminophen (Percocet)  MG per tablet, , Disp: , Rfl:   •  valsartan (DIOVAN) 320 MG tablet, valsartan 320 mg tablet  Take 1 tablet every day by oral route as directed for 30 days., Disp: , Rfl:   •  albuterol (PROVENTIL) (2.5 MG/3ML) 0.083% nebulizer solution, albuterol sulfate 2.5 mg/3 mL (0.083 %) solution for nebulization  Inhale 3 mL every 4-6 hours by nebulization route as needed for 30 days., Disp: , Rfl:   •  albuterol sulfate  (90 Base) MCG/ACT inhaler, albuterol sulfate HFA 90 mcg/actuation aerosol inhaler  Inhale 2 puffs every 4-6 hours by inhalation route as needed for 30 days., Disp: , Rfl:   •  aspirin 81 MG chewable tablet, aspirin 81 mg oral tablet,chewable chew 1 tablet (81 mg) by oral route once daily   Active, Disp: , Rfl:   •  benzoyl peroxide 5 % gel, benzoyl peroxide 5 % topical gel  APPLY TO THE AFFECTED AREA(S) BY TOPICAL ROUTE ONCE DAILY, Disp: , Rfl:   •  divalproex (DEPAKOTE ER) 500 MG 24 hr tablet, divalproex  mg tablet,extended release 24 hr, Disp: , Rfl:   •  doxepin (SINEquan) 25 MG capsule, doxepin 25 mg oral capsule take 1-2 capsules by oral route once a day (at bedtime)   Active, Disp: , Rfl:   •  doxycycline (MONODOX) 50 MG capsule, Every 12 (Twelve) Hours., Disp: , Rfl:   •  DULoxetine (CYMBALTA) 30 MG capsule, duloxetine 30 mg capsule,delayed release, Disp: , Rfl:   •  DULoxetine (CYMBALTA) 60 MG capsule, duloxetine 60  "mg capsule,delayed release, Disp: , Rfl:   •  fluticasone-salmeterol (ADVAIR) 250-50 MCG/DOSE DISKUS, fluticasone 250 mcg-salmeterol 50 mcg/dose blistr powdr for inhalation  Inhale 1 puff twice a day by inhalation route as directed for 30 days., Disp: , Rfl:   •  lisinopril (PRINIVIL,ZESTRIL) 40 MG tablet, lisinopril 40 mg oral tablet take 1 tablet (40 mg) by oral route once daily   Suspended, Disp: , Rfl:   •  malathion (OVIDE) 0.5 % lotion, malathion 0.5 % lotion, Disp: , Rfl:   •  methocarbamol (ROBAXIN) 500 MG tablet, methocarbamol 500 mg tablet, Disp: , Rfl:   •  oxyCODONE (ROXICODONE) 10 MG tablet, oxycodone 10 mg oral tablet take 1 tablet by oral route 4 times a day   Active, Disp: , Rfl:   •  pantoprazole (PROTONIX) 40 MG EC tablet, pantoprazole 40 mg oral tablet,delayed release (DR/EC) take 1 tablet (40 mg) by oral route once daily   Suspended, Disp: , Rfl:   •  potassium chloride (Klor-Con) 20 MEQ packet, Klor-Con 20 mEq oral packet take 1 packet (20 meq) dissolved in 4-6 ounces of cold water or juice by oral route 2 times per day   Suspended, Disp: , Rfl:   •  propranolol (INDERAL) 10 MG tablet, propranolol 10 mg oral tablet take 1 tablet (10 mg) by oral route 3 times per day   Suspended, Disp: , Rfl:   •  triamcinolone (KENALOG) 0.1 % cream, triamcinolone acetonide 0.1 % topical cream, Disp: , Rfl:   •  valsartan-hydrochlorothiazide (DIOVAN-HCT) 320-25 MG per tablet, valsartan-hydrochlorothiazide 320-25 mg oral tablet take 1 tablet by oral route once daily   Active, Disp: , Rfl:      Allergies   Allergen Reactions   • Amitriptyline Unknown - Low Severity   • Duloxetine Hcl Nausea Only       Social History     Tobacco Use   • Smoking status: Current Every Day Smoker     Packs/day: 1.00     Types: Cigarettes   • Smokeless tobacco: Never Used   Substance Use Topics   • Alcohol use: Not on file   • Drug use: Not on file        Objective     Vital Signs:   Temp 98 °F (36.7 °C) (Temporal)   Ht 167.6 cm (66\")  "  Wt 87.5 kg (192 lb 12.8 oz)   BMI 31.12 kg/m²       Physical Exam    General: Well developed, well nourished patient of stated age in no acute distress. Voice is strong and clear.   Head: Normocephalic and atraumatic.  Face: Cystic lesion versus scar tissue involving the left anterior lobular crease.  Bilateral parotid and submandibular glands are unremarkable.  Stensen's and Warthin's ducts are productive of clear saliva bilaterally.  House-Brackmann I/VI     bilaterally.   muscles and temporomandibular joint nontender to palpation.  No TMJ crepitus.  Eyes: PERRLA, sclerae anicteric, no conjunctival injection. Extra ocular movements are intact and full. No nystagmus.   Ears: Auricles are normal in appearance. Bilateral external auditory canals are unremarkable. Bilateral tympanic membranes are clear and without effusion. Hearing normal to conversational voice.   Nose: External nose is normal in appearance. Bilateral nares are patent with normal appearing mucosa. Septum midline. Turbinates are unremarkable. No lesions.   Oral Cavity: Lips are normal in appearance. Oral mucosa is unremarkable. Gingiva is unremarkable.  Edentulous.  Tongue is unremarkable with good movement. Hard palate is unremarkable.   Oropharynx: Soft palate is unremarkable with full movement. Uvula is unremarkable. Bilateral tonsils are unremarkable. Posterior oropharynx is unremarkable.    Larynx and hypopharynx: Deferred secondary to gag reflex.  Neck: Supple.  No mass.  Nontender to palpation.  Trachea midline. Thyroid normal size and without nodules to palpation.   Lymphatic: No lymphadenopathy upon palpation.   Psychiatric: Appropriate affect, cooperative   Neurologic: Oriented x 3, strength symmetric in all extremities, Cranial Nerves II-XII are grossly intact to confrontation   Skin: Warm and dry. No rashes.    Procedures           Result Review :               Assessment and Plan    Diagnoses and all orders for this  visit:    1. Epidermal inclusion cyst (Primary)      Impressions and findings were discussed.  Currently, he is coping with a left anterior lobular crease epidermal inclusion cyst which he can express once weekly.  At this time, he will continue with observation and call if it becomes bothersome enough that he desires to undergo surgery.      Follow Up   No follow-ups on file.  Patient was given instructions and counseling regarding his condition or for health maintenance advice. Please see specific information pulled into the AVS if appropriate.